# Patient Record
Sex: MALE | Employment: OTHER | URBAN - METROPOLITAN AREA
[De-identification: names, ages, dates, MRNs, and addresses within clinical notes are randomized per-mention and may not be internally consistent; named-entity substitution may affect disease eponyms.]

---

## 2019-10-01 ENCOUNTER — HOSPITAL ENCOUNTER (EMERGENCY)
Facility: CLINIC | Age: 54
Discharge: HOME | End: 2019-10-01
Attending: EMERGENCY MEDICINE | Admitting: EMERGENCY MEDICINE

## 2019-10-01 ENCOUNTER — APPOINTMENT (OUTPATIENT)
Dept: GENERAL RADIOLOGY | Facility: CLINIC | Age: 54
End: 2019-10-01
Attending: EMERGENCY MEDICINE

## 2019-10-01 ENCOUNTER — APPOINTMENT (OUTPATIENT)
Dept: CT IMAGING | Facility: CLINIC | Age: 54
End: 2019-10-01
Attending: EMERGENCY MEDICINE

## 2019-10-01 VITALS
SYSTOLIC BLOOD PRESSURE: 140 MMHG | BODY MASS INDEX: 18.32 KG/M2 | DIASTOLIC BLOOD PRESSURE: 68 MMHG | WEIGHT: 128 LBS | RESPIRATION RATE: 10 BRPM | TEMPERATURE: 98.8 F | HEIGHT: 70 IN | HEART RATE: 43 BPM | OXYGEN SATURATION: 96 %

## 2019-10-01 DIAGNOSIS — R06.00 DYSPNEA, UNSPECIFIED TYPE: ICD-10-CM

## 2019-10-01 DIAGNOSIS — R07.9 CHEST PAIN, UNSPECIFIED TYPE: ICD-10-CM

## 2019-10-01 DIAGNOSIS — R91.1 PULMONARY NODULE: ICD-10-CM

## 2019-10-01 DIAGNOSIS — R42 DIZZINESS: ICD-10-CM

## 2019-10-01 LAB
ANION GAP SERPL CALCULATED.3IONS-SCNC: 8 MMOL/L (ref 3–14)
BASOPHILS # BLD AUTO: 0.1 10E9/L (ref 0–0.2)
BASOPHILS NFR BLD AUTO: 1.4 %
BUN SERPL-MCNC: 9 MG/DL (ref 7–30)
CALCIUM SERPL-MCNC: 8.6 MG/DL (ref 8.5–10.1)
CHLORIDE SERPL-SCNC: 106 MMOL/L (ref 94–109)
CO2 SERPL-SCNC: 23 MMOL/L (ref 20–32)
CREAT SERPL-MCNC: 0.69 MG/DL (ref 0.66–1.25)
D DIMER PPP FEU-MCNC: 1.3 UG/ML FEU (ref 0–0.5)
DIFFERENTIAL METHOD BLD: ABNORMAL
EOSINOPHIL # BLD AUTO: 0.1 10E9/L (ref 0–0.7)
EOSINOPHIL NFR BLD AUTO: 1.2 %
ERYTHROCYTE [DISTWIDTH] IN BLOOD BY AUTOMATED COUNT: 13.6 % (ref 10–15)
ETHANOL SERPL-MCNC: <0.01 G/DL
GFR SERPL CREATININE-BSD FRML MDRD: >90 ML/MIN/{1.73_M2}
GLUCOSE SERPL-MCNC: 91 MG/DL (ref 70–99)
HCT VFR BLD AUTO: 41.3 % (ref 40–53)
HGB BLD-MCNC: 14.2 G/DL (ref 13.3–17.7)
IMM GRANULOCYTES # BLD: 0 10E9/L (ref 0–0.4)
IMM GRANULOCYTES NFR BLD: 0.1 %
INTERPRETATION ECG - MUSE: NORMAL
LYMPHOCYTES # BLD AUTO: 2.6 10E9/L (ref 0.8–5.3)
LYMPHOCYTES NFR BLD AUTO: 34.5 %
MCH RBC QN AUTO: 33.7 PG (ref 26.5–33)
MCHC RBC AUTO-ENTMCNC: 34.4 G/DL (ref 31.5–36.5)
MCV RBC AUTO: 98 FL (ref 78–100)
MONOCYTES # BLD AUTO: 1 10E9/L (ref 0–1.3)
MONOCYTES NFR BLD AUTO: 12.4 %
NEUTROPHILS # BLD AUTO: 3.9 10E9/L (ref 1.6–8.3)
NEUTROPHILS NFR BLD AUTO: 50.4 %
NRBC # BLD AUTO: 0 10*3/UL
NRBC BLD AUTO-RTO: 0 /100
NT-PROBNP SERPL-MCNC: 91 PG/ML (ref 0–900)
PLATELET # BLD AUTO: 323 10E9/L (ref 150–450)
POTASSIUM SERPL-SCNC: 4.3 MMOL/L (ref 3.4–5.3)
RBC # BLD AUTO: 4.21 10E12/L (ref 4.4–5.9)
SODIUM SERPL-SCNC: 137 MMOL/L (ref 133–144)
TROPONIN I SERPL-MCNC: <0.015 UG/L (ref 0–0.04)
WBC # BLD AUTO: 7.7 10E9/L (ref 4–11)

## 2019-10-01 PROCEDURE — 96360 HYDRATION IV INFUSION INIT: CPT

## 2019-10-01 PROCEDURE — 85025 COMPLETE CBC W/AUTO DIFF WBC: CPT | Performed by: EMERGENCY MEDICINE

## 2019-10-01 PROCEDURE — 71275 CT ANGIOGRAPHY CHEST: CPT

## 2019-10-01 PROCEDURE — 25000125 ZZHC RX 250: Performed by: EMERGENCY MEDICINE

## 2019-10-01 PROCEDURE — 83880 ASSAY OF NATRIURETIC PEPTIDE: CPT | Performed by: EMERGENCY MEDICINE

## 2019-10-01 PROCEDURE — 80048 BASIC METABOLIC PNL TOTAL CA: CPT | Performed by: EMERGENCY MEDICINE

## 2019-10-01 PROCEDURE — 80320 DRUG SCREEN QUANTALCOHOLS: CPT | Performed by: EMERGENCY MEDICINE

## 2019-10-01 PROCEDURE — 85379 FIBRIN DEGRADATION QUANT: CPT | Performed by: EMERGENCY MEDICINE

## 2019-10-01 PROCEDURE — 93005 ELECTROCARDIOGRAM TRACING: CPT | Mod: 76

## 2019-10-01 PROCEDURE — 96361 HYDRATE IV INFUSION ADD-ON: CPT

## 2019-10-01 PROCEDURE — 25000128 H RX IP 250 OP 636: Performed by: EMERGENCY MEDICINE

## 2019-10-01 PROCEDURE — 25000132 ZZH RX MED GY IP 250 OP 250 PS 637: Performed by: EMERGENCY MEDICINE

## 2019-10-01 PROCEDURE — 99285 EMERGENCY DEPT VISIT HI MDM: CPT | Mod: 25

## 2019-10-01 PROCEDURE — 84484 ASSAY OF TROPONIN QUANT: CPT | Performed by: EMERGENCY MEDICINE

## 2019-10-01 PROCEDURE — 93005 ELECTROCARDIOGRAM TRACING: CPT

## 2019-10-01 PROCEDURE — 71046 X-RAY EXAM CHEST 2 VIEWS: CPT

## 2019-10-01 RX ORDER — IOPAMIDOL 755 MG/ML
500 INJECTION, SOLUTION INTRAVASCULAR ONCE
Status: COMPLETED | OUTPATIENT
Start: 2019-10-01 | End: 2019-10-01

## 2019-10-01 RX ORDER — ASPIRIN 81 MG/1
324 TABLET, CHEWABLE ORAL ONCE
Status: COMPLETED | OUTPATIENT
Start: 2019-10-01 | End: 2019-10-01

## 2019-10-01 RX ADMIN — LIDOCAINE HYDROCHLORIDE 30 ML: 20 SOLUTION ORAL; TOPICAL at 14:10

## 2019-10-01 RX ADMIN — ASPIRIN 81 MG 324 MG: 81 TABLET ORAL at 14:10

## 2019-10-01 RX ADMIN — IOPAMIDOL 61 ML: 755 INJECTION, SOLUTION INTRAVENOUS at 14:37

## 2019-10-01 RX ADMIN — SODIUM CHLORIDE 1000 ML: 9 INJECTION, SOLUTION INTRAVENOUS at 14:18

## 2019-10-01 RX ADMIN — SODIUM CHLORIDE 83 ML: 9 INJECTION, SOLUTION INTRAVENOUS at 14:38

## 2019-10-01 ASSESSMENT — ENCOUNTER SYMPTOMS
LIGHT-HEADEDNESS: 1
HEADACHES: 1
SHORTNESS OF BREATH: 1
CHILLS: 0
FEVER: 0

## 2019-10-01 ASSESSMENT — MIFFLIN-ST. JEOR: SCORE: 1426.85

## 2019-10-01 NOTE — ED PROVIDER NOTES
"History     Chief Complaint:  Chest Pain     HPI  Isael Colon is a 54 year old male with who presents to the emergency department today with chest pain. The patient states he has chest pain, shortness of breath, lightheadedness, right sided mild headache and numbness/tingling in his fingers and tongue for 5 days. He states the lightheadedness has been ongoing for a week or so. He states it is more like a gripping sensation with severity of 4/10 that was intermittent on the day of onset, but has been constant for the last 3 days. He states it worsens with physical exertion and inhalation. He states he has had thi julio cesar before it self resolved. He states he had a cough and wheezing 2 weeks ago. He states while at work he stands a lot, causing his legs to swell and subsides with rest. He denies fever, chills, changes to baseline cough, leg rash, long period sitting/travel or drug use. He states his mother had a heart attack in her 60's. He denies a history of clotting disorders. He endorses smoking marijuana daily.     Allergies:  No known allergies    Medications:    Naprosyn    Past Medical History:    The patient does not have any pertinent past medical history.    Past Surgical History:    History reviewed. No pertinent surgical history.    Family History:    Mother - heart attack age \"60 or 70\". Otherwise noncontributory.    Social History:  The patient was accompanied to the ED alone.  Smoking Status: Current  Alcohol Use: Yes   Drug Use: Yes, marijuana  PCP: No primary care provider on file.   Marital Status:  Single     Review of Systems   Constitutional: Negative for chills and fever.   Respiratory: Positive for shortness of breath.    Cardiovascular: Positive for chest pain.   Skin: Negative for rash.   Neurological: Positive for light-headedness and headaches.   All other systems reviewed and are negative.       Physical Exam     Patient Vitals for the past 24 hrs:   BP Temp Temp src Pulse Heart Rate " "Resp SpO2 Height Weight   10/01/19 1309 -- 98.8  F (37.1  C) Oral -- -- -- -- 1.778 m (5' 10\") 58.1 kg (128 lb)   10/01/19 1302 (!) 149/96 -- -- 51 51 17 98 % -- --        Physical Exam  General: Adult male, appearing older than stated age, sitting upright  Eyes: PERRL, Conjunctive within normal limits  ENT: Moist mucous membranes, oropharynx clear.    CV: Normal S1S2, no murmur, rub or gallop. Bradycardic, regular rhythm. Radial pulses intact bilaterally.  Resp: Clear to auscultation bilaterally, no wheezes, rales or rhonchi. Normal respiratory effort.  GI: Abdomen is soft, nontender and nondistended. No palpable masses. No rebound or guarding.  MSK: No edema. Nontender. Normal active range of motion. No calf asymmetry or tenderness.   Skin: Warm and dry. No rashes or lesions or ecchymoses on visible skin.  Neuro: Alert and oriented. Responds appropriately to all questions and commands. No focal findings appreciated. Normal muscle tone. Sensation intact to light touch over all dermatomes of the bilateral upper extremities.  Psych: Normal mood and affect. Pleasant.    Emergency Department Course     ECst: Indication: chest pain   Time: 1306   Vent. Rate 49 bpm. WI interval 104. QRS duration 86. QT/QTc 440/397. P-R-T axis 69 -31 37.  Sinus bradycardia with short WI. Left axis deviation. Abnormal ECG.     Read time: 1306    2nd: Indication: chest pain   Time: 1515   Vent. Rate 49 bpm. WI interval 150. QRS duration 88. QT/QTc 450/406. P-R-T axis 56 -20 40.  Sinus bradycardia. Otherwise normal ECG.    Read time: 1516    Imaging:  Radiology findings were communicated with the patient who voiced understanding of the findings.    XR Chest PA and LAT:   IMPRESSION: No pneumothorax. There are no acute infiltrates. The  cardiac silhouette is not enlarged. Pulmonary vasculature is  Unremarkable, as per radiology.    CT Chest Pulmonary embolism w/ contrast   IMPRESSION:   1. No pulmonary embolism demonstrated.  2. No " thoracic aortic dissection or aneurysm.   3. 6 mm nodular density in the left upper lobe, as per radiology.     Laboratory:  Laboratory findings were communicated with the patient who voiced understanding of the findings.  CBC: WBC: 7.7, HGB: 14.2, PLT: 323  BMP: Glucose: 91, o/w WNL (Creatinine: 0.69)  1305 Troponin: <0.015  D-dimer: 1.3 (H)  BNP: 91  ETOH: <0.01     Interventions:  1410  mg PO  1410 GI cocktail 30 mL PO  1418 NS 1,000 mLs IV    Emergency Department Course:  Nursing notes and vitals reviewed. (12:58 PM) I performed an exam of the patient as documented above.     IV inserted. Blood drawn. This was sent to the lab for further testing, results above.     Medicine administered as documented above.    The patient was sent for XR and CT while in the emergency department, results above.     1432 I rechecked the patient and discussed the results of their workup thus far.     1507 I rechecked the patient. He states he still feels the same.    1543 I rechecked the patient. He denies any new concerns. Feels comfortable with plan.    I personally reviewed the workup results with the Patient and answered all related questions prior to discharge. Patient discharged home with instructions regarding supportive care, medications, and reasons to return. The importance of close follow-up was reviewed.     Impression & Plan       Medical Decision Making:  This patient presents with chest pain of unclear etiology.  At this time I do not suspect that there is an acute/dangerous pathology for the chest pain.  They have no significant personal/family history of cardiac ds.  They have no significant cardiac risk factors.  Their EKG and CXR were unremarkable.  Their trop was neg and I feel their risk is low-enough to not warrant delta trops.  I have also considered PE due to a DDimer of 1.3 and a CT PE was obtained, but unremarkable for PE or aortic pathology. There are no focal infiltrates, effusions, pulm edema, or  evidence of PTX on imaging. The patient has not had recent fevers or viral infections to suggest pericarditis.  They are at low risk for aortic pathology..  They have not had recent chest trauma.  All of this was discussed with the pt and they were reassured.  I have advised them to follow-up with their PCP in the next 3 days to get further evaluation, and to return to the ED sooner if their chest pain continues/worsens, they develop severe shortness of breath/fevers/lightheadedness, or they develop any other new and concerning sxs. At this point the patient is HD stable and appropriate for discharge.    Diagnosis:    ICD-10-CM    1. Chest pain, unspecified type R07.9 Echo Stress Echocardiogram   2. Dyspnea, unspecified type R06.00    3. Dizziness R42    4. Pulmonary nodule R91.1      Disposition:  Discharged to home.    Scribe Disclosure:  I, Lenard Sierra, am serving as a scribe at 12:58 PM on 10/1/2019 to document services personally performed by Nancy Carson MD based on my observations and the provider's statements to me.      10/1/2019   [facility] EMERGENCY DEPARTMENT       Nancy Carson MD  10/03/19 0838

## 2019-10-01 NOTE — ED TRIAGE NOTES
Left lower anterior chest pain which he descries as tightness accompanied by SOB, and extremity numbness since Thursday. Reports he is a smoker and endorses family hx of heart disease. A&O x 4 VSS on RA.

## 2019-10-01 NOTE — ED AVS SNAPSHOT
[facility] Emergency Department  [address]  [facility] [address]  Phone:  [phone]  Fax:  [phone]                                    Isael Colon   MRN: [id number]    Department:  [facility] Emergency Department   Date of Visit:  10/1/2019           After Visit Summary Signature Page    I have received my discharge instructions, and my questions have been answered. I have discussed any challenges I see with this plan with the nurse or doctor.    ..........................................................................................................................................  Patient/Patient Representative Signature      ..........................................................................................................................................  Patient Representative Print Name and Relationship to Patient    ..................................................               ................................................  Date                                   Time    ..........................................................................................................................................  Reviewed by Signature/Title    ...................................................              ..............................................  Date                                               Time          22EPIC Rev 08/18

## 2019-10-02 ENCOUNTER — DOCUMENTATION ONLY (OUTPATIENT)
Dept: OTHER | Facility: CLINIC | Age: 54
End: 2019-10-02

## 2019-10-02 LAB — INTERPRETATION ECG - MUSE: NORMAL

## 2019-10-04 ENCOUNTER — HOSPITAL ENCOUNTER (OUTPATIENT)
Dept: CARDIOLOGY | Facility: CLINIC | Age: 54
Discharge: HOME | End: 2019-10-04
Attending: EMERGENCY MEDICINE | Admitting: EMERGENCY MEDICINE

## 2019-10-04 DIAGNOSIS — R07.9 CHEST PAIN, UNSPECIFIED TYPE: ICD-10-CM

## 2019-10-04 PROCEDURE — 93016 CV STRESS TEST SUPVJ ONLY: CPT | Performed by: INTERNAL MEDICINE

## 2019-10-04 PROCEDURE — 93350 STRESS TTE ONLY: CPT | Mod: 26 | Performed by: INTERNAL MEDICINE

## 2019-10-04 PROCEDURE — 93018 CV STRESS TEST I&R ONLY: CPT | Performed by: INTERNAL MEDICINE

## 2019-10-04 PROCEDURE — 93325 DOPPLER ECHO COLOR FLOW MAPG: CPT | Mod: TC

## 2019-10-04 PROCEDURE — 93321 DOPPLER ECHO F-UP/LMTD STD: CPT | Mod: 26 | Performed by: INTERNAL MEDICINE

## 2019-10-04 PROCEDURE — 93325 DOPPLER ECHO COLOR FLOW MAPG: CPT | Mod: 26 | Performed by: INTERNAL MEDICINE

## 2019-10-04 NOTE — PROGRESS NOTES
Treadmill stress echo completed.  Patient did complain of chest discomfort with the treadmill, Dr. Garcia looked at the test and said that the patient was able to go home but if the chest pain continues that he should go back in and see a primary doctor or a Cardiologist about this chest pain.

## 2021-06-05 ENCOUNTER — APPOINTMENT (OUTPATIENT)
Dept: GENERAL RADIOLOGY | Facility: HOSPITAL | Age: 56
End: 2021-06-05

## 2021-06-05 ENCOUNTER — HOSPITAL ENCOUNTER (OUTPATIENT)
Facility: HOSPITAL | Age: 56
Setting detail: OBSERVATION
Discharge: HOME | End: 2021-06-05
Attending: EMERGENCY MEDICINE | Admitting: HOSPITALIST

## 2021-06-05 VITALS
HEIGHT: 70 IN | TEMPERATURE: 98.2 F | HEART RATE: 50 BPM | RESPIRATION RATE: 18 BRPM | WEIGHT: 131 LBS | OXYGEN SATURATION: 98 % | BODY MASS INDEX: 18.75 KG/M2 | SYSTOLIC BLOOD PRESSURE: 126 MMHG | DIASTOLIC BLOOD PRESSURE: 60 MMHG

## 2021-06-05 DIAGNOSIS — R07.89 CHEST PAIN, ATYPICAL: Primary | ICD-10-CM

## 2021-06-05 DIAGNOSIS — I20.0 UNSTABLE ANGINA PECTORIS (HCC): Primary | ICD-10-CM

## 2021-06-05 LAB
ALBUMIN SERPL-MCNC: 3.9 G/DL (ref 3.5–5.2)
ALBUMIN/GLOB SERPL: 1.4 G/DL
ALP SERPL-CCNC: 49 U/L (ref 39–117)
ALT SERPL W P-5'-P-CCNC: 14 U/L (ref 1–41)
AMPHET+METHAMPHET UR QL: NEGATIVE
AMPHETAMINES UR QL: NEGATIVE
ANION GAP SERPL CALCULATED.3IONS-SCNC: 15.5 MMOL/L (ref 5–15)
AST SERPL-CCNC: 32 U/L (ref 1–40)
BARBITURATES UR QL SCN: NEGATIVE
BASOPHILS # BLD AUTO: 0.08 10*3/MM3 (ref 0–0.2)
BASOPHILS NFR BLD AUTO: 1.2 % (ref 0–1.5)
BENZODIAZ UR QL SCN: NEGATIVE
BILIRUB SERPL-MCNC: 0.4 MG/DL (ref 0–1.2)
BUN SERPL-MCNC: 11 MG/DL (ref 6–20)
BUN/CREAT SERPL: 15.9 (ref 7–25)
BUPRENORPHINE SERPL-MCNC: NEGATIVE NG/ML
CALCIUM SPEC-SCNC: 9.2 MG/DL (ref 8.6–10.5)
CANNABINOIDS SERPL QL: POSITIVE
CHLORIDE SERPL-SCNC: 97 MMOL/L (ref 98–107)
CHOLEST SERPL-MCNC: 215 MG/DL (ref 0–200)
CO2 SERPL-SCNC: 19.5 MMOL/L (ref 22–29)
COCAINE UR QL: NEGATIVE
CREAT SERPL-MCNC: 0.69 MG/DL (ref 0.76–1.27)
DEPRECATED RDW RBC AUTO: 50.8 FL (ref 37–54)
EOSINOPHIL # BLD AUTO: 0.04 10*3/MM3 (ref 0–0.4)
EOSINOPHIL NFR BLD AUTO: 0.6 % (ref 0.3–6.2)
ERYTHROCYTE [DISTWIDTH] IN BLOOD BY AUTOMATED COUNT: 14.3 % (ref 12.3–15.4)
GFR SERPL CREATININE-BSD FRML MDRD: 119 ML/MIN/1.73
GLOBULIN UR ELPH-MCNC: 2.8 GM/DL
GLUCOSE SERPL-MCNC: 110 MG/DL (ref 65–99)
HCT VFR BLD AUTO: 38.8 % (ref 37.5–51)
HDLC SERPL-MCNC: 108 MG/DL (ref 40–60)
HGB BLD-MCNC: 13.1 G/DL (ref 13–17.7)
HOLD SPECIMEN: NORMAL
HOLD SPECIMEN: NORMAL
IMM GRANULOCYTES # BLD AUTO: 0.01 10*3/MM3 (ref 0–0.05)
IMM GRANULOCYTES NFR BLD AUTO: 0.1 % (ref 0–0.5)
LDLC SERPL CALC-MCNC: 98 MG/DL (ref 0–100)
LDLC/HDLC SERPL: 0.9 {RATIO}
LYMPHOCYTES # BLD AUTO: 2.18 10*3/MM3 (ref 0.7–3.1)
LYMPHOCYTES NFR BLD AUTO: 32 % (ref 19.6–45.3)
MCH RBC QN AUTO: 32.1 PG (ref 26.6–33)
MCHC RBC AUTO-ENTMCNC: 33.8 G/DL (ref 31.5–35.7)
MCV RBC AUTO: 95.1 FL (ref 79–97)
METHADONE UR QL SCN: NEGATIVE
MONOCYTES # BLD AUTO: 0.63 10*3/MM3 (ref 0.1–0.9)
MONOCYTES NFR BLD AUTO: 9.3 % (ref 5–12)
NEUTROPHILS NFR BLD AUTO: 3.87 10*3/MM3 (ref 1.7–7)
NEUTROPHILS NFR BLD AUTO: 56.8 % (ref 42.7–76)
OPIATES UR QL: NEGATIVE
OXYCODONE UR QL SCN: NEGATIVE
PCP UR QL SCN: NEGATIVE
PLATELET # BLD AUTO: 354 10*3/MM3 (ref 140–450)
PMV BLD AUTO: 9 FL (ref 6–12)
POTASSIUM SERPL-SCNC: 4.2 MMOL/L (ref 3.5–5.2)
PROPOXYPH UR QL: NEGATIVE
PROT SERPL-MCNC: 6.7 G/DL (ref 6–8.5)
RBC # BLD AUTO: 4.08 10*6/MM3 (ref 4.14–5.8)
SODIUM SERPL-SCNC: 132 MMOL/L (ref 136–145)
TRICYCLICS UR QL SCN: NEGATIVE
TRIGL SERPL-MCNC: 49 MG/DL (ref 0–150)
TROPONIN T SERPL-MCNC: <0.01 NG/ML (ref 0–0.03)
VLDLC SERPL-MCNC: 9 MG/DL (ref 5–40)
WBC # BLD AUTO: 6.81 10*3/MM3 (ref 3.4–10.8)
WHOLE BLOOD HOLD SPECIMEN: NORMAL
WHOLE BLOOD HOLD SPECIMEN: NORMAL

## 2021-06-05 PROCEDURE — 84484 ASSAY OF TROPONIN QUANT: CPT | Performed by: EMERGENCY MEDICINE

## 2021-06-05 PROCEDURE — G0378 HOSPITAL OBSERVATION PER HR: HCPCS

## 2021-06-05 PROCEDURE — 99285 EMERGENCY DEPT VISIT HI MDM: CPT

## 2021-06-05 PROCEDURE — 94760 N-INVAS EAR/PLS OXIMETRY 1: CPT

## 2021-06-05 PROCEDURE — 80306 DRUG TEST PRSMV INSTRMNT: CPT | Performed by: HOSPITALIST

## 2021-06-05 PROCEDURE — 84484 ASSAY OF TROPONIN QUANT: CPT | Performed by: INTERNAL MEDICINE

## 2021-06-05 PROCEDURE — 71045 X-RAY EXAM CHEST 1 VIEW: CPT

## 2021-06-05 PROCEDURE — 25010000002 ENOXAPARIN PER 10 MG: Performed by: HOSPITALIST

## 2021-06-05 PROCEDURE — 99220 PR INITIAL OBSERVATION CARE/DAY 70 MINUTES: CPT | Performed by: HOSPITALIST

## 2021-06-05 PROCEDURE — 93010 ELECTROCARDIOGRAM REPORT: CPT | Performed by: INTERNAL MEDICINE

## 2021-06-05 PROCEDURE — 85025 COMPLETE CBC W/AUTO DIFF WBC: CPT | Performed by: EMERGENCY MEDICINE

## 2021-06-05 PROCEDURE — 80061 LIPID PANEL: CPT | Performed by: INTERNAL MEDICINE

## 2021-06-05 PROCEDURE — 99285 EMERGENCY DEPT VISIT HI MDM: CPT | Performed by: EMERGENCY MEDICINE

## 2021-06-05 PROCEDURE — 93005 ELECTROCARDIOGRAM TRACING: CPT | Performed by: EMERGENCY MEDICINE

## 2021-06-05 PROCEDURE — 80053 COMPREHEN METABOLIC PANEL: CPT | Performed by: EMERGENCY MEDICINE

## 2021-06-05 PROCEDURE — 93005 ELECTROCARDIOGRAM TRACING: CPT | Performed by: HOSPITALIST

## 2021-06-05 PROCEDURE — 94799 UNLISTED PULMONARY SVC/PX: CPT

## 2021-06-05 RX ORDER — ALUMINA, MAGNESIA, AND SIMETHICONE 2400; 2400; 240 MG/30ML; MG/30ML; MG/30ML
15 SUSPENSION ORAL EVERY 6 HOURS PRN
Status: DISCONTINUED | OUTPATIENT
Start: 2021-06-05 | End: 2021-06-05 | Stop reason: HOSPADM

## 2021-06-05 RX ORDER — NICOTINE 21 MG/24HR
1 PATCH, TRANSDERMAL 24 HOURS TRANSDERMAL EVERY 24 HOURS
Qty: 30 PATCH | Refills: 0 | Status: SHIPPED | OUTPATIENT
Start: 2021-06-06 | End: 2021-07-06

## 2021-06-05 RX ORDER — BISACODYL 10 MG
10 SUPPOSITORY, RECTAL RECTAL DAILY PRN
Status: DISCONTINUED | OUTPATIENT
Start: 2021-06-05 | End: 2021-06-05 | Stop reason: HOSPADM

## 2021-06-05 RX ORDER — ASPIRIN 81 MG/1
81 TABLET ORAL DAILY
Start: 2021-06-06

## 2021-06-05 RX ORDER — AMOXICILLIN 250 MG
2 CAPSULE ORAL 2 TIMES DAILY
Status: DISCONTINUED | OUTPATIENT
Start: 2021-06-05 | End: 2021-06-05 | Stop reason: HOSPADM

## 2021-06-05 RX ORDER — POLYETHYLENE GLYCOL 3350 17 G/17G
17 POWDER, FOR SOLUTION ORAL DAILY PRN
Status: DISCONTINUED | OUTPATIENT
Start: 2021-06-05 | End: 2021-06-05 | Stop reason: HOSPADM

## 2021-06-05 RX ORDER — CHOLECALCIFEROL (VITAMIN D3) 125 MCG
5 CAPSULE ORAL NIGHTLY PRN
Status: DISCONTINUED | OUTPATIENT
Start: 2021-06-05 | End: 2021-06-05 | Stop reason: HOSPADM

## 2021-06-05 RX ORDER — NICOTINE 21 MG/24HR
1 PATCH, TRANSDERMAL 24 HOURS TRANSDERMAL EVERY 24 HOURS
Status: DISCONTINUED | OUTPATIENT
Start: 2021-06-05 | End: 2021-06-05 | Stop reason: HOSPADM

## 2021-06-05 RX ORDER — ROSUVASTATIN CALCIUM 40 MG/1
40 TABLET, COATED ORAL DAILY
Qty: 30 TABLET | Refills: 0 | Status: SHIPPED | OUTPATIENT
Start: 2021-06-05 | End: 2021-07-05

## 2021-06-05 RX ORDER — NITROGLYCERIN 0.4 MG/1
0.4 TABLET SUBLINGUAL
Status: DISCONTINUED | OUTPATIENT
Start: 2021-06-05 | End: 2021-06-05 | Stop reason: HOSPADM

## 2021-06-05 RX ORDER — ASPIRIN 81 MG/1
81 TABLET ORAL DAILY
Status: DISCONTINUED | OUTPATIENT
Start: 2021-06-06 | End: 2021-06-05 | Stop reason: HOSPADM

## 2021-06-05 RX ORDER — SODIUM CHLORIDE 0.9 % (FLUSH) 0.9 %
10 SYRINGE (ML) INJECTION AS NEEDED
Status: DISCONTINUED | OUTPATIENT
Start: 2021-06-05 | End: 2021-06-05 | Stop reason: HOSPADM

## 2021-06-05 RX ORDER — NITROGLYCERIN 0.4 MG/1
0.4 TABLET SUBLINGUAL
Qty: 25 TABLET | Refills: 12 | Status: SHIPPED | OUTPATIENT
Start: 2021-06-05

## 2021-06-05 RX ORDER — PANTOPRAZOLE SODIUM 40 MG/1
40 TABLET, DELAYED RELEASE ORAL
Status: DISCONTINUED | OUTPATIENT
Start: 2021-06-05 | End: 2021-06-05 | Stop reason: HOSPADM

## 2021-06-05 RX ORDER — SODIUM CHLORIDE 0.9 % (FLUSH) 0.9 %
10 SYRINGE (ML) INJECTION EVERY 12 HOURS SCHEDULED
Status: DISCONTINUED | OUTPATIENT
Start: 2021-06-05 | End: 2021-06-05 | Stop reason: HOSPADM

## 2021-06-05 RX ORDER — SODIUM CHLORIDE 9 MG/ML
40 INJECTION, SOLUTION INTRAVENOUS AS NEEDED
Status: DISCONTINUED | OUTPATIENT
Start: 2021-06-05 | End: 2021-06-05 | Stop reason: HOSPADM

## 2021-06-05 RX ORDER — BISACODYL 5 MG/1
5 TABLET, DELAYED RELEASE ORAL DAILY PRN
Status: DISCONTINUED | OUTPATIENT
Start: 2021-06-05 | End: 2021-06-05 | Stop reason: HOSPADM

## 2021-06-05 RX ADMIN — NITROGLYCERIN 1 INCH: 20 OINTMENT TOPICAL at 12:20

## 2021-06-05 RX ADMIN — ENOXAPARIN SODIUM 40 MG: 40 INJECTION SUBCUTANEOUS at 14:58

## 2021-06-05 RX ADMIN — NITROGLYCERIN 0.4 MG: 0.4 TABLET SUBLINGUAL at 12:06

## 2021-06-05 RX ADMIN — PANTOPRAZOLE SODIUM 40 MG: 40 TABLET, DELAYED RELEASE ORAL at 14:58

## 2021-06-05 RX ADMIN — NICOTINE 1 PATCH: 21 PATCH, EXTENDED RELEASE TRANSDERMAL at 14:58

## 2021-06-06 ENCOUNTER — READMISSION MANAGEMENT (OUTPATIENT)
Dept: CALL CENTER | Facility: HOSPITAL | Age: 56
End: 2021-06-06

## 2021-06-06 LAB
QT INTERVAL: 405 MS
QT INTERVAL: 410 MS
QT INTERVAL: 453 MS

## 2021-06-07 ENCOUNTER — READMISSION MANAGEMENT (OUTPATIENT)
Dept: CALL CENTER | Facility: HOSPITAL | Age: 56
End: 2021-06-07

## 2021-06-08 ENCOUNTER — APPOINTMENT (OUTPATIENT)
Dept: NUCLEAR MEDICINE | Facility: HOSPITAL | Age: 56
End: 2021-06-08

## 2021-06-08 ENCOUNTER — APPOINTMENT (OUTPATIENT)
Dept: CARDIOLOGY | Facility: HOSPITAL | Age: 56
End: 2021-06-08

## 2021-06-11 ENCOUNTER — HOSPITAL ENCOUNTER (OUTPATIENT)
Dept: NUCLEAR MEDICINE | Facility: HOSPITAL | Age: 56
Discharge: HOME | End: 2021-06-11

## 2021-06-11 ENCOUNTER — HOSPITAL ENCOUNTER (OUTPATIENT)
Dept: CARDIOLOGY | Facility: HOSPITAL | Age: 56
Discharge: HOME | End: 2021-06-11

## 2021-06-11 DIAGNOSIS — R07.89 CHEST PAIN, ATYPICAL: ICD-10-CM

## 2021-06-11 LAB
BH CV REST NUCLEAR ISOTOPE DOSE: 11.8 MCI
BH CV STRESS BP STAGE 1: NORMAL
BH CV STRESS COMMENTS STAGE 1: NORMAL
BH CV STRESS DOSE REGADENOSON STAGE 1: 0.4
BH CV STRESS DURATION MIN STAGE 1: 0
BH CV STRESS DURATION SEC STAGE 1: 30
BH CV STRESS HR STAGE 1: 44
BH CV STRESS NUCLEAR ISOTOPE DOSE: 33.5 MCI
BH CV STRESS O2 STAGE 1: 98
BH CV STRESS PROTOCOL 1: NORMAL
BH CV STRESS RECOVERY BP: NORMAL MMHG
BH CV STRESS RECOVERY HR: 63 BPM
BH CV STRESS RECOVERY O2: 100 %
BH CV STRESS STAGE 1: 1
LV EF NUC BP: 70 %
MAXIMAL PREDICTED HEART RATE: 165 BPM
PERCENT MAX PREDICTED HR: 75.15 %
STRESS BASELINE BP: NORMAL MMHG
STRESS BASELINE HR: 44 BPM
STRESS O2 SAT REST: 98 %
STRESS PERCENT HR: 88 %
STRESS POST ESTIMATED WORKLOAD: 1 METS
STRESS POST EXERCISE DUR SEC: 30 SEC
STRESS POST O2 SAT PEAK: 100 %
STRESS POST PEAK BP: NORMAL MMHG
STRESS POST PEAK HR: 124 BPM
STRESS TARGET HR: 140 BPM

## 2021-06-11 PROCEDURE — A9500 TC99M SESTAMIBI: HCPCS | Performed by: INTERNAL MEDICINE

## 2021-06-11 PROCEDURE — 93017 CV STRESS TEST TRACING ONLY: CPT

## 2021-06-11 PROCEDURE — 0 TECHNETIUM SESTAMIBI: Performed by: INTERNAL MEDICINE

## 2021-06-11 PROCEDURE — 93016 CV STRESS TEST SUPVJ ONLY: CPT | Performed by: INTERNAL MEDICINE

## 2021-06-11 PROCEDURE — 25010000002 REGADENOSON 0.4 MG/5ML SOLUTION: Performed by: INTERNAL MEDICINE

## 2021-06-11 PROCEDURE — 78452 HT MUSCLE IMAGE SPECT MULT: CPT

## 2021-06-11 PROCEDURE — 78452 HT MUSCLE IMAGE SPECT MULT: CPT | Performed by: INTERNAL MEDICINE

## 2021-06-11 PROCEDURE — 93018 CV STRESS TEST I&R ONLY: CPT | Performed by: INTERNAL MEDICINE

## 2021-06-11 RX ADMIN — TECHNETIUM TC 99M SESTAMIBI 1 DOSE: 1 INJECTION INTRAVENOUS at 07:51

## 2021-06-11 RX ADMIN — REGADENOSON 0.4 MG: 0.08 INJECTION, SOLUTION INTRAVENOUS at 09:35

## 2021-06-11 RX ADMIN — TECHNETIUM TC 99M SESTAMIBI 1 DOSE: 1 INJECTION INTRAVENOUS at 09:35

## 2021-06-14 ENCOUNTER — TELEPHONE (OUTPATIENT)
Dept: CARDIOLOGY | Facility: CLINIC | Age: 56
End: 2021-06-14

## 2021-07-03 ENCOUNTER — APPOINTMENT (OUTPATIENT)
Dept: GENERAL RADIOLOGY | Facility: HOSPITAL | Age: 56
End: 2021-07-03

## 2021-07-03 ENCOUNTER — HOSPITAL ENCOUNTER (EMERGENCY)
Facility: HOSPITAL | Age: 56
Discharge: HOME | End: 2021-07-03
Attending: EMERGENCY MEDICINE | Admitting: EMERGENCY MEDICINE

## 2021-07-03 ENCOUNTER — APPOINTMENT (OUTPATIENT)
Dept: CT IMAGING | Facility: HOSPITAL | Age: 56
End: 2021-07-03

## 2021-07-03 VITALS
WEIGHT: 134 LBS | OXYGEN SATURATION: 97 % | RESPIRATION RATE: 18 BRPM | TEMPERATURE: 97.3 F | HEART RATE: 49 BPM | SYSTOLIC BLOOD PRESSURE: 131 MMHG | HEIGHT: 70 IN | BODY MASS INDEX: 19.18 KG/M2 | DIASTOLIC BLOOD PRESSURE: 77 MMHG

## 2021-07-03 DIAGNOSIS — R07.89 ATYPICAL CHEST PAIN: Primary | ICD-10-CM

## 2021-07-03 LAB
ALBUMIN SERPL-MCNC: 4.3 G/DL (ref 3.5–5.2)
ALBUMIN/GLOB SERPL: 1.5 G/DL
ALP SERPL-CCNC: 51 U/L (ref 39–117)
ALT SERPL W P-5'-P-CCNC: 16 U/L (ref 1–41)
ANION GAP SERPL CALCULATED.3IONS-SCNC: 10.9 MMOL/L (ref 5–15)
AST SERPL-CCNC: 34 U/L (ref 1–40)
BASOPHILS # BLD AUTO: 0.07 10*3/MM3 (ref 0–0.2)
BASOPHILS NFR BLD AUTO: 0.8 % (ref 0–1.5)
BILIRUB SERPL-MCNC: 0.5 MG/DL (ref 0–1.2)
BUN SERPL-MCNC: 6 MG/DL (ref 6–20)
BUN/CREAT SERPL: 8.7 (ref 7–25)
CALCIUM SPEC-SCNC: 9 MG/DL (ref 8.6–10.5)
CHLORIDE SERPL-SCNC: 100 MMOL/L (ref 98–107)
CO2 SERPL-SCNC: 22.1 MMOL/L (ref 22–29)
CREAT SERPL-MCNC: 0.69 MG/DL (ref 0.76–1.27)
D DIMER PPP FEU-MCNC: 1.21 MCGFEU/ML (ref 0–0.46)
DEPRECATED RDW RBC AUTO: 53.4 FL (ref 37–54)
EOSINOPHIL # BLD AUTO: 0.06 10*3/MM3 (ref 0–0.4)
EOSINOPHIL NFR BLD AUTO: 0.7 % (ref 0.3–6.2)
ERYTHROCYTE [DISTWIDTH] IN BLOOD BY AUTOMATED COUNT: 15 % (ref 12.3–15.4)
GFR SERPL CREATININE-BSD FRML MDRD: 119 ML/MIN/1.73
GLOBULIN UR ELPH-MCNC: 2.8 GM/DL
GLUCOSE SERPL-MCNC: 86 MG/DL (ref 65–99)
HCT VFR BLD AUTO: 38.4 % (ref 37.5–51)
HGB BLD-MCNC: 13.1 G/DL (ref 13–17.7)
IMM GRANULOCYTES # BLD AUTO: 0.02 10*3/MM3 (ref 0–0.05)
IMM GRANULOCYTES NFR BLD AUTO: 0.2 % (ref 0–0.5)
LYMPHOCYTES # BLD AUTO: 2.7 10*3/MM3 (ref 0.7–3.1)
LYMPHOCYTES NFR BLD AUTO: 30.3 % (ref 19.6–45.3)
MCH RBC QN AUTO: 32.6 PG (ref 26.6–33)
MCHC RBC AUTO-ENTMCNC: 34.1 G/DL (ref 31.5–35.7)
MCV RBC AUTO: 95.5 FL (ref 79–97)
MONOCYTES # BLD AUTO: 0.99 10*3/MM3 (ref 0.1–0.9)
MONOCYTES NFR BLD AUTO: 11.1 % (ref 5–12)
NEUTROPHILS NFR BLD AUTO: 5.06 10*3/MM3 (ref 1.7–7)
NEUTROPHILS NFR BLD AUTO: 56.9 % (ref 42.7–76)
NRBC BLD AUTO-RTO: 0 /100 WBC (ref 0–0.2)
PLATELET # BLD AUTO: 367 10*3/MM3 (ref 140–450)
PMV BLD AUTO: 9.2 FL (ref 6–12)
POTASSIUM SERPL-SCNC: 3.9 MMOL/L (ref 3.5–5.2)
PROT SERPL-MCNC: 7.1 G/DL (ref 6–8.5)
QT INTERVAL: 441 MS
RBC # BLD AUTO: 4.02 10*6/MM3 (ref 4.14–5.8)
SODIUM SERPL-SCNC: 133 MMOL/L (ref 136–145)
TROPONIN T SERPL-MCNC: <0.01 NG/ML (ref 0–0.03)
TROPONIN T SERPL-MCNC: <0.01 NG/ML (ref 0–0.03)
WBC # BLD AUTO: 8.9 10*3/MM3 (ref 3.4–10.8)

## 2021-07-03 PROCEDURE — 0 IOPAMIDOL PER 1 ML: Performed by: EMERGENCY MEDICINE

## 2021-07-03 PROCEDURE — 36415 COLL VENOUS BLD VENIPUNCTURE: CPT

## 2021-07-03 PROCEDURE — 71275 CT ANGIOGRAPHY CHEST: CPT

## 2021-07-03 PROCEDURE — 71046 X-RAY EXAM CHEST 2 VIEWS: CPT

## 2021-07-03 PROCEDURE — 80053 COMPREHEN METABOLIC PANEL: CPT | Performed by: EMERGENCY MEDICINE

## 2021-07-03 PROCEDURE — 84484 ASSAY OF TROPONIN QUANT: CPT | Performed by: EMERGENCY MEDICINE

## 2021-07-03 PROCEDURE — 85025 COMPLETE CBC W/AUTO DIFF WBC: CPT | Performed by: EMERGENCY MEDICINE

## 2021-07-03 PROCEDURE — 85379 FIBRIN DEGRADATION QUANT: CPT | Performed by: EMERGENCY MEDICINE

## 2021-07-03 PROCEDURE — 93010 ELECTROCARDIOGRAM REPORT: CPT | Performed by: INTERNAL MEDICINE

## 2021-07-03 PROCEDURE — 93005 ELECTROCARDIOGRAM TRACING: CPT | Performed by: EMERGENCY MEDICINE

## 2021-07-03 PROCEDURE — 99283 EMERGENCY DEPT VISIT LOW MDM: CPT

## 2021-07-03 PROCEDURE — 99284 EMERGENCY DEPT VISIT MOD MDM: CPT | Performed by: EMERGENCY MEDICINE

## 2021-07-03 RX ORDER — SODIUM CHLORIDE 0.9 % (FLUSH) 0.9 %
10 SYRINGE (ML) INJECTION AS NEEDED
Status: DISCONTINUED | OUTPATIENT
Start: 2021-07-03 | End: 2021-07-03 | Stop reason: HOSPADM

## 2021-07-03 RX ORDER — DICLOFENAC SODIUM 75 MG/1
75 TABLET, DELAYED RELEASE ORAL 2 TIMES DAILY PRN
Qty: 20 TABLET | Refills: 0 | Status: SHIPPED | OUTPATIENT
Start: 2021-07-03

## 2021-07-03 RX ADMIN — SODIUM CHLORIDE 1000 ML: 9 INJECTION, SOLUTION INTRAVENOUS at 16:35

## 2021-07-03 RX ADMIN — IOPAMIDOL 100 ML: 755 INJECTION, SOLUTION INTRAVENOUS at 18:03
